# Patient Record
Sex: FEMALE
[De-identification: names, ages, dates, MRNs, and addresses within clinical notes are randomized per-mention and may not be internally consistent; named-entity substitution may affect disease eponyms.]

---

## 2021-05-06 ENCOUNTER — TRANSCRIPTION ENCOUNTER (OUTPATIENT)
Age: 61
End: 2021-05-06

## 2022-09-14 ENCOUNTER — APPOINTMENT (OUTPATIENT)
Dept: ENDOCRINOLOGY | Facility: CLINIC | Age: 62
End: 2022-09-14

## 2022-09-14 VITALS — DIASTOLIC BLOOD PRESSURE: 72 MMHG | SYSTOLIC BLOOD PRESSURE: 118 MMHG

## 2022-09-14 VITALS — BODY MASS INDEX: 23.11 KG/M2 | WEIGHT: 156 LBS | HEIGHT: 69 IN

## 2022-09-14 DIAGNOSIS — Z83.49 FAMILY HISTORY OF OTHER ENDOCRINE, NUTRITIONAL AND METABOLIC DISEASES: ICD-10-CM

## 2022-09-14 DIAGNOSIS — Z78.9 OTHER SPECIFIED HEALTH STATUS: ICD-10-CM

## 2022-09-14 DIAGNOSIS — Z82.3 FAMILY HISTORY OF STROKE: ICD-10-CM

## 2022-09-14 DIAGNOSIS — M81.0 AGE-RELATED OSTEOPOROSIS W/OUT CURRENT PATHOLOGICAL FRACTURE: ICD-10-CM

## 2022-09-14 DIAGNOSIS — Z87.39 PERSONAL HISTORY OF OTHER DISEASES OF THE MUSCULOSKELETAL SYSTEM AND CONNECTIVE TISSUE: ICD-10-CM

## 2022-09-14 DIAGNOSIS — Z83.3 FAMILY HISTORY OF DIABETES MELLITUS: ICD-10-CM

## 2022-09-14 DIAGNOSIS — E55.9 VITAMIN D DEFICIENCY, UNSPECIFIED: ICD-10-CM

## 2022-09-14 DIAGNOSIS — Z86.79 PERSONAL HISTORY OF OTHER DISEASES OF THE CIRCULATORY SYSTEM: ICD-10-CM

## 2022-09-14 DIAGNOSIS — I10 ESSENTIAL (PRIMARY) HYPERTENSION: ICD-10-CM

## 2022-09-14 PROCEDURE — 99204 OFFICE O/P NEW MOD 45 MIN: CPT

## 2022-10-03 NOTE — ASSESSMENT
[FreeTextEntry1] : 62-year-old female with history of osteoporosis and subacute thyroiditis in the past\par Osteoporosis\par Patient is up-to-date with dental exam\par No change in height\par No recent fragility fractures\par Continue Prolia\par Check updated labs prior to blood work\par \par Subacute thyroiditis 2013\par Check updated thyroid function panel as well as thyroid antibodies\par Check ultrasound of thyroid\par \par \par Positive ASO antibody\par Patient asking me for opinion\par I told her it was really not in the realm of endocrinology\par She really needs to discuss this with her primary doctor and perhaps even rheumatologist\par

## 2022-10-03 NOTE — HISTORY OF PRESENT ILLNESS
[FreeTextEntry1] : Patient here for evaluation of osteoporosis\par In addition history of having thyroiditis\par In 2013 patient found to have an elevated CRP that went from 9>>115\par Patient was given steroid, had thyroid sonogram, and never needed medications\par In addition her ASO titers have been high and apparently her endocrinologist could never figure that out  \par \par \par Patient found to have T6 score in the spine of -2.4 T score in the neck -2.1 T score in the hip -2.2\par In 2018 patient took a fall in [place] fractured her pelvis\par \par Current medications include\par Prolia every 6 months\par Methotrexate 6 tablets/week\par Folic acid\par Losartan\par Turmeric\par Probiotic\par D-mannose\par Vitamin D 5000 units daily\par B complex\par Pepcid AC\par Calcium 600 mg 2 pills daily\par Magnesium\par \par Past medical history hypertension\par Osteoporosis\par Osteoarthritis\par Cardiac ablation for extra beats \par Seeing rheumatologist\par Had been on Plaquenil in the past currently on methotrexate\par \par Family history\par Mom osteoarthritis\par Osteopenia\par Elevated A1c\par High cholesterol\par Father carotid disease\par Siblings with diabetes\par \par Allergies\par Sulfa Plaquenil clindamycin Keflex\par

## 2022-10-03 NOTE — REVIEW OF SYSTEMS
[Negative] : Heme/Lymph [de-identified] : Some anxiety with empty nest syndrome–seeing therapist at

## 2022-11-09 ENCOUNTER — APPOINTMENT (OUTPATIENT)
Dept: ENDOCRINOLOGY | Facility: CLINIC | Age: 62
End: 2022-11-09

## 2023-05-03 ENCOUNTER — APPOINTMENT (OUTPATIENT)
Dept: ENDOCRINOLOGY | Facility: CLINIC | Age: 63
End: 2023-05-03

## 2023-08-07 ENCOUNTER — APPOINTMENT (OUTPATIENT)
Dept: PULMONOLOGY | Facility: CLINIC | Age: 63
End: 2023-08-07

## 2024-02-07 ENCOUNTER — APPOINTMENT (OUTPATIENT)
Dept: PULMONOLOGY | Facility: CLINIC | Age: 64
End: 2024-02-07
Payer: COMMERCIAL

## 2024-02-07 VITALS
OXYGEN SATURATION: 97 % | SYSTOLIC BLOOD PRESSURE: 120 MMHG | HEART RATE: 67 BPM | RESPIRATION RATE: 16 BRPM | DIASTOLIC BLOOD PRESSURE: 70 MMHG

## 2024-02-07 VITALS — WEIGHT: 135 LBS | BODY MASS INDEX: 19.94 KG/M2

## 2024-02-07 DIAGNOSIS — J30.89 OTHER ALLERGIC RHINITIS: ICD-10-CM

## 2024-02-07 DIAGNOSIS — Z87.891 PERSONAL HISTORY OF NICOTINE DEPENDENCE: ICD-10-CM

## 2024-02-07 PROCEDURE — 99204 OFFICE O/P NEW MOD 45 MIN: CPT

## 2024-02-07 RX ORDER — LOSARTAN POTASSIUM 100 MG/1
TABLET, FILM COATED ORAL
Refills: 0 | Status: ACTIVE | COMMUNITY

## 2024-02-07 NOTE — ASSESSMENT
[FreeTextEntry1] : 63F PMH former smoker, COVID-19 (2021), HTN, frequent PVCs s/p ablation (2022), pulmonary nodules who presents for initial pulmonary evaluation.  - Pt has been having sudden SOB when submersing herself under water while swimming, as though there is a pressure on her chest - She is otherwise able to tolerate an hour of spin class, above-ground exercises well - Unclear cause of this - however of note it only started approx 3 mo after having COVID in 2021 - Need to r/o cardiac arrhythmia as a cause, given her h/o PVCs s/p ablation - she will f/u with cardiology - She denies history of anxiety, and previously was able to do swimming exercises without issue - She tried albuterol but this did not help - Will do allergy testing - Will have her return for full PFTs to evaluate for underlying asthma - Repeat CT chest 04/2024 - prior RML nodule appears benign, non-PET avid - F/u in short interim for PFT  The patient expressed understanding and agreement with the plan as outlined above and accepts responsibility to be compliant with any recommended testing, treatment, and follow-up visits.  All relevant questions and concerns were addressed.  45 minutes of time were spent on the encounter. Medical records were reviewed, including but not limited to hospital records, outpatient records, laboratory data, and diagnostic imaging studies. Greater than 50% of the face-to-face encounter time was spent on counseling and/or coordination of care.  Hugo Solano M.D. Pulmonary & Critical Care Medicine [facility] at [place] [address] [place], [place] [address] T: [phone] F: [phone]
left normal/right normal

## 2024-02-07 NOTE — HISTORY OF PRESENT ILLNESS
[Former] : former [TextBox_4] : 63F PMH former smoker, COVID-19 (2021), HTN, frequent PVCs s/p ablation (2022), pulmonary nodules who presents for initial pulmonary evaluation. She has a known 11mm RML nodule that was 8mm on prior in 2016. PET-CT 04/2023 was negative for FDG uptake. She notes that approx 3 mo after having COVID in 2021, she started noticing that she feels a pressure in her chest when swimming, specifically when her body is more fully immersed in water. This has never happened before in the past. She denies any anxiety. She denies being dx with asthma. She notably had frequent PVCs in the past that was ablated in 2022.  [TextBox_11] : 0.5 [YearQuit] : 1982 [TextBox_13] : 5

## 2024-02-07 NOTE — RESULTS/DATA
[TextEntry] : [facility]ANGELINEDE[facility] PATIENT NAME: Araceli Pelaez PATIENT PHONE NUMBER: [phone] PATIENT ID: [id number] : 1960 DATE OF EXAM: 2023 R. Phys. Name: Sandra Méndez R. Phys. Address: [address] [place] [address] R. Phys. Phone: [phone] CT-CALCIUM SCORING  History: Screening examination.  Axial images were obtained from the thoracic inlet through the lung bases without intravenous contrast. Sagittal and coronal reconstructions were submitted. This study was performed using automatic exposure control software to provide quality images with patient radiation dose as low as reasonably achievable. The radiation dose for the current examination was estimated at mSv.  The mediastinal, cardiac and pulmonary vascular structures are normal. The patient's total coronary calcium score is 0.  No enlarged lymph nodes are seen within the chest or axillae.  No pleural effusions are present. Trace pericardial fluid.  Calcified and noncalcified pulmonary nodules again seen. No new or enlarging focal pulmonary parenchymal or bronchial abnormalities are seen in either lung.  No focal bony lesions are identified.   IMPRESSION:   1. Total coronary calcium score of 0.  2. No active pulmonary disease.   Classification of Patients based on 10 year Risk of Future Cardiac Event  <10% = Low Risk 10-20% = Intermediate Risk >20% = High Risk  Classification of Patients based on Calcium Scores  Calcium Score Number // 10 Year Risk  0 <2% 1-100 <10% 100-400 >10% >400 >20%  Recommendations based on Calcium Scores  0 Reassure 1-100 Risk Factor Modification 100-400 AHA Prevention Guidelines-consider stress test >400 AHA Prevention Guidelines-evaluate for ischemia  Signed by: Scot Chun Signed Date: 10/2/2023 11:21 PM EDT    SIGNED BY: Scot Chun MD, Ext. [phone] 10/02/2023 11:21 PM   ~~~~~~~~~~~~~~~~~~~~~~~~~~~~~~~~~~~~~~~~~~~~~~~~~~~~~~~~~~~~~~~~~~~~~~~~  [facility] PATIENT NAME: Araceli Pelaez PATIENT PHONE NUMBER: [phone] PATIENT ID: [id number] : 1960 DATE OF EXAM: 2023 R. Phys. Name: Luis Armando Freitas Address: [address] [place] [address] R. Phys. Phone: [phone] CT-CHEST NON CONTRAST  HISTORY: R91.1 Pulmonary Nodule R06.02 Shortness Of Breath  TECHNIQUE: Noncontrast CT of the chest was performed. Axial, coronal and sagittal images were reconstructed using iterative reconstruction technique. This study was performed using automatic exposure control (radiation dose reduction software) to obtain a diagnostic image quality scan with patient dose as low as reasonably achievable. The mA and kV were adjusted according to patient size. The administered radiation dose was 1.918 mSv.  Lung nodules.  Smoking history is provided.  COMPARISON: Chest CT examination 2022 12/15/2022  LUNGS AND AIRWAYS: Biapical scarring is unchanged. There is a stable appearance to an 11 mm right middle lobe nodule (series 2, image 64). There is a stable appearance to sub-5 mm calcified and noncalcified nodules elsewhere within the right lung and within the left lung. There has been no interval development of suspicious nodularity. Fibrotic/subsegmental atelectatic changes in the lungs are again noted. Elevation of the right hemidiaphragm is again seen.  PLEURA: There is no evidence of a pleural effusion.  THYROID GLAND: There is no evidence of thyroid gland enlargement.  MEDIASTINUM, DENICE: There is no evidence of lymphadenopathy.  HEART AND VESSELS: There is no evidence of aneurysmal dilatation of the thoracic aorta. Pericardial thickening and/or small pericardial effusion are noted.  CHEST WALL/SOFT TISSUES/AXILLAE: There is no evidence of axillary lymphadenopathy.  BONES: There is no evidence of an aggressive osseous lesion.   UPPER ABDOMEN (partially imaged): Unremarkable   IMPRESSION:   Stable lung nodularity. There is a stable appearance to an 11 mm right middle lobe nodule and a stable appearance to sub-5 mm calcified and noncalcified probable granulomas. Follow-up chest CT examination in 6 months time is suggested to assess for continued stability.  Pericardial thickening/small pericardial effusion.       Pulmonary nodules (multiple) R91.8 Pericardial effusion I31.3  Signed by: Tristian Garcia MD Signed Date: 3/28/2023 9:33 AM EDT    SIGNED BY: Tristian Garcia M.D., Ext. [id number] 2023 09:33 AM  ADDENDUM: This addendum is being issued following review of a CT angiogram performed at another institution 2016. It is noted that comparison was already made with the prior chest CT examinations performed 2022 and 12/15/2022.  11 mm right middle lobe nodule (series 3, image 238) measured 8 mm on the prior study. A 4 mm noncalcified nodule in the right lower lobe is unchanged (series 3, image 219). Calcified granulomas within the lungs are essentially unchanged.  IMPRESSION:  Calcified and noncalcified nodules are essentially unchanged other than increase in size with respect to the right middle lobe nodule, now measuring 11 mm compared to a prior measurement of 8 mm. It is noted that this 11 mm nodule is stable in appearance when compared to the prior studies performed 2022 and 12/15/2022.  Signed by: Tristian Garcia MD Signed Date: 2023 3:23 PM EDT  ~~~~~~~~~~~~~~~~~~~~~~~~~~~~~~~~~~~~~~~~~~~~~~~~~~~~~~~~~~~~~~~~~~~~~~~~  [facility] 	 Exam requested by: AMISHA CLEVELAND MD [address] [place] [address] SITE PERFORMED: [facility] [place] Patient: ARACELI PELAEZ YOB: 1960 Phone: [phone]  MRN: [id number] Acc: [id number] Date of Exam: 2023   EXAM:  PET CT TUMOR IMAGING SKULL-THIGH  Note - This patient has received 0 CT studies and 0 Myocardial Perfusion studies within our network over the previous 12 month period.  HISTORY: 63-year-old female with history of enlarging right middle lobe lung nodule on CT imaging.  PET/CT REQUESTED FOR: Initial antitumor treatment strategy.  TECHNIQUE: Approximately 75 minutes following intravenous administration of of 10.6 mCi of 18-FDG, via the left antecubital vein, PET/CT imaging was acquired from the cranial vertex to the thighs. At the time of injection, the patient's blood glucose level was 89 mg/dL. Prior to the imaging, a CT examination of the head, chest, abdomen, and pelvis was acquired following administration of barium oral contrast.  Multiplanar reconstructed images were reviewed. One or more of the following dose reduction techniques were used: automated exposure control, adjustment of the MA and/or KV according to patient size, and use of iterative reconstruction technique.  COMPARISON: Report of a prior outside CT scan of the chest dated 3/24/2023.  FINDINGS:  There is no abnormal focal uptake associated with an 11 x 8 mm well-circumscribed nodule in the right middle lobe on functional image 128, which appears to be associated with a central punctate calcification. Additional calcified granulomas measuring up to 5 mm and a 5 mm noncalcified nodule in the right lower lobe on image 129 were described on the report of prior imaging, too small to definitely characterize.  The hilar contours are normal. There are no abnormally enlarged or hypermetabolic lymph nodes in the mediastinum or in the rest of the chest.  Heterogeneous uptake is seen associated with the right shoulder likely degenerative in nature.  Otherwise, uptake in the brain, kidneys, and bladder lumen appears age-appropriate.  ADDITIONAL CT IMAGING FINDINGS:   There is no abnormal mass effect or midline shift in the brain. The ventricular system is not abnormally dilated. The orbits are symmetric without abnormal preseptal soft tissue thickening. The paranasal sinuses and mastoid air cells are well aerated. The central skull base is intact.  There are no suspicious lymph nodes in either side of the neck. There is no abnormal mucosal contour abnormality or focal nodularity in the aerodigestive tract. The major salivary glands are symmetric.  There is no endobronchial soft tissue mass. Mild to moderate respiratory motion accentuates peripheral scarring and posterior dependent changes. There is no pleural effusion. There is no chest wall mass. The heart is normal in size without pericardial effusion.  Respiratory motion in the upper abdomen limits evaluation of the solid organs. The liver and spleen are normal in size without obvious focal lesion. There is no biliary or pancreatic ductal dilatation. There is no adrenal nodule. The kidneys are normal in size without hydronephrosis or hydroureter. There is no bowel obstruction. Oral contrast is seen in the distal colon with visualization of multiple sigmoid diverticula, without acute inflammatory changes. The bladder is collapsed. The uterus is normal in contour. There is no suspicious adnexal mass. There is no omental nodular infiltration. There is no free fluid or free intraperitoneal air.  There is apparent posttraumatic deformity involving the left anterior pubic ramus with a transversely oriented fixation screw at the left sacroiliac joint. Degenerative type changes are seen in the hips, pelvis, shoulders, and multiple levels of the spine, especially in the lower cervical and L5-S1. There are no suspicious osteolytic or osteoblastic type lesions.  IMPRESSION:   1. No focal uptake associated with the reportedly enlarging well-circumscribed nodule in the right middle lobe. An FDG avid malignancy is excluded. The appearance is suggestive of previous granulomatous disease or possibly a pulmonary hamartoma.  2. No suspicious lymph nodes in the chest. No evidence of FDG avid malignancy in the rest of the body.  3. Other incidental findings without abnormal hypermetabolism including architectural changes in the lungs, diverticulosis, posttraumatic deformity in the left anterior pelvis and sacroiliac fixation, and degenerative disease. These could be compared to prior imaging to confirm stability.   Key images provided.  DLP: 1297.03 mGy*cm  ~~~~~~~~~~~~~~~~~~~~~~~~~~~~~~~~~~~~~~~~~~~~~~~~~~~~~~~~~~~~~~~~~~~~~~~~

## 2024-04-29 ENCOUNTER — APPOINTMENT (OUTPATIENT)
Dept: PULMONOLOGY | Facility: CLINIC | Age: 64
End: 2024-04-29
Payer: COMMERCIAL

## 2024-04-29 VITALS — WEIGHT: 144 LBS | BODY MASS INDEX: 21.82 KG/M2 | HEIGHT: 68 IN

## 2024-04-29 DIAGNOSIS — R91.1 SOLITARY PULMONARY NODULE: ICD-10-CM

## 2024-04-29 PROCEDURE — 85018 HEMOGLOBIN: CPT | Mod: QW

## 2024-04-29 PROCEDURE — 94010 BREATHING CAPACITY TEST: CPT

## 2024-04-29 PROCEDURE — 94727 GAS DIL/WSHOT DETER LNG VOL: CPT

## 2024-04-29 PROCEDURE — 94729 DIFFUSING CAPACITY: CPT

## 2024-06-14 ENCOUNTER — APPOINTMENT (OUTPATIENT)
Dept: PULMONOLOGY | Facility: CLINIC | Age: 64
End: 2024-06-14
Payer: COMMERCIAL

## 2024-06-14 ENCOUNTER — RX CHANGE (OUTPATIENT)
Age: 64
End: 2024-06-14

## 2024-06-14 VITALS
SYSTOLIC BLOOD PRESSURE: 120 MMHG | OXYGEN SATURATION: 97 % | DIASTOLIC BLOOD PRESSURE: 82 MMHG | RESPIRATION RATE: 16 BRPM | HEART RATE: 66 BPM

## 2024-06-14 VITALS — WEIGHT: 145 LBS | HEIGHT: 68 IN | BODY MASS INDEX: 21.98 KG/M2

## 2024-06-14 DIAGNOSIS — J44.9 CHRONIC OBSTRUCTIVE PULMONARY DISEASE, UNSPECIFIED: ICD-10-CM

## 2024-06-14 DIAGNOSIS — J45.909 UNSPECIFIED ASTHMA, UNCOMPLICATED: ICD-10-CM

## 2024-06-14 DIAGNOSIS — Z87.891 PERSONAL HISTORY OF NICOTINE DEPENDENCE: ICD-10-CM

## 2024-06-14 PROCEDURE — G2211 COMPLEX E/M VISIT ADD ON: CPT | Mod: NC

## 2024-06-14 PROCEDURE — 99214 OFFICE O/P EST MOD 30 MIN: CPT

## 2024-06-14 RX ORDER — FLUTICASONE PROPIONATE AND SALMETEROL 250; 50 UG/1; UG/1
250-50 POWDER RESPIRATORY (INHALATION)
Qty: 1 | Refills: 5 | Status: ACTIVE | COMMUNITY
Start: 1900-01-01 | End: 1900-01-01

## 2024-06-14 RX ORDER — BUDESONIDE AND FORMOTEROL FUMARATE DIHYDRATE 160; 4.5 UG/1; UG/1
160-4.5 AEROSOL RESPIRATORY (INHALATION)
Qty: 1 | Refills: 5 | Status: DISCONTINUED | COMMUNITY
Start: 2024-06-14 | End: 2024-06-14

## 2024-06-14 RX ORDER — DENOSUMAB 60 MG/ML
60 INJECTION SUBCUTANEOUS
Refills: 0 | Status: ACTIVE | COMMUNITY

## 2024-06-14 NOTE — ASSESSMENT
[FreeTextEntry1] : 64F PMH former smoker, non-PET avid pulmonary nodules, mild COPD, COVID-19 (2021), HTN, frequent PVCs s/p ablation (2022), pulmonary nodules who presents for f/u visit.   - PFT 04/2024 showed FEV1/FVC 64.5%, FEV1 71.1%, FVC 87%, MMEF 75/25 38.4%, TLC 84.2%, VCmax 93.3%, DLCOc 71.7%.  - This is consistent with mild COPD however I do suspect a component of reactive airways disease - Patient is very physically active however mainly notices SOB with swimming - Will do trial of Symbicort PRN - Instructed to rinse mouth after each use - Recent CT was stable - 11mm nodule - PET[facility] at [facility] 04/2023 was non PET-avid - F/u in 4 mo time to re-assess  The patient expressed understanding and agreement with the plan as outlined above and accepts responsibility to be compliant with any recommended testing, treatment, and follow-up visits.  All relevant questions and concerns were addressed.  30 minutes of time were spent on the encounter. Medical records were reviewed, including but not limited to hospital records, outpatient records, laboratory data, and diagnostic imaging studies. Greater than 50% of the face-to-face encounter time was spent on counseling and/or coordination of care.  Hugo Solano MD, [facility] Pulmonary & Critical Care Medicine [facility] Pul[facility] Sleep Medicine at [place] [address] [place], [place] [address] T: [phone] F: [phone]

## 2024-06-14 NOTE — HISTORY OF PRESENT ILLNESS
[Former] : former [TextBox_4] : 64F PMH former smoker, non-PET avid pulmonary nodules, mild COPD, COVID-19 (2021), HTN, frequent PVCs s/p ablation (2022), pulmonary nodules who presents for f/u visit. PFT 04/2024 showed FEV1/FVC 64.5%, FEV1 71.1%, FVC 87%, MMEF 75/25 38.4%, TLC 84.2%, VCmax 93.3%, DLCOc 71.7%. She continues to get SOB primarily with swimming. Notably she is very physically active, has completed multiple marathons in the past. No fevers or chills or chest pains.  [TextBox_11] : 0.5 [TextBox_13] : 5 [YearQuit] : 1982

## 2024-06-14 NOTE — RESULTS/DATA
[TextEntry] : [facility] PATIENT NAME: Araceli Pelaez PATIENT PHONE NUMBER: [phone] PATIENT ID: [id number] : 1960 DATE OF EXAM: 2024 R. Phys. Name: Hugo Solano RLacy Phys. Address: [address] [place] [address] R Phys. Phone: [phone] EXAM: CT-CHEST NON CONTRAST  HISTORY: Lung nodules  TECHNIQUE: Axial images through the chest were obtained without intravenous contrast. Coronal and sagittal reformats were performed. Thin section high-resolution axial images were obtained. Axial MIP images were obtained on a separate workstation and available for interpretation. This study was performed using automatic exposure control and an iterative reconstruction technique (radiation dose reduction software) to obtain a diagnostic image quality scan with patient dose as low as reasonably achievable. The mA and kV were adjusted according to patient size. The administered radiation dose was 2.464mSv.  COMPARISON: CT 3/24/2023, 2022  FINDINGS: Diffuse lung findings and airways: Central airways patent. No focal endobronchial lesion. Scattered calcified granulomas.  INDIVIDUAL LOBES Right upper lobe: No suspicious nodule. Right middle lobe: 11 mm solid nodule (series 3 image 49), similar to prior. Right lower lobe: 4 mm solid nodule (series 3 image 226), similar prior. Left upper lobe: No suspicious nodule. Left lower lobe: No suspicious nodule.  Pleura: Normal. Heart and pericardium: Heart size normal. Small volume pericardial fluid, similar prior. Coronary calcium: No coronary artery calcifications visualized.  Mediastinum and darline: Normal. No lymphadenopathy. Vessels: Normal.  Lower neck: Normal. Chest wall: Normal. Bones: Unremarkable for age. Upper abdomen: Left renal cyst.  IMPRESSION:  * Pulmonary nodules measuring up to 11 mm, similar to CT 2022.  Signed by: Omar Acharya Signed Date: 2024 12:15 PM EDT    SIGNED BY: Omar Acharya MD, Ext. [phone] 12:15 PM  ~~~~~~~~~~~~~~~~~~~~~~~~~~~~~~~~~~~~~~~~~~~~~~~~~~~~~~~~~~~~~~~~~~~~~~~~  [facility]  Exam requested by: AMISHA CLEVELAND MD [address] [place] [address] SITE PERFORMED: [facility] Patient: ARACELI PELAEZ YOB: 1960 Phone: [phone] MRN: [id number] Acc: [id number] Date of Exam: 2023  EXAM: PET CT TUMOR IMAGING SKULL-THIGH  Note - This patient has received 0 CT studies and 0 Myocardial Perfusion studies within our network over the previous 12 month period.  HISTORY: 63-year-old female with history of enlarging right middle lobe lung nodule on CT imaging.  PET/CT REQUESTED FOR: Initial antitumor treatment strategy.  TECHNIQUE: Approximately 75 minutes following intravenous administration of of 10.6 mCi of 18-FDG, via the left antecubital vein, PET/CT imaging was acquired from the cranial vertex to the thighs. At the time of injection, the patient's blood glucose level was 89 mg/dL. Prior to the imaging, a CT examination of the head, chest, abdomen, and pelvis was acquired following administration of barium oral contrast.  Multiplanar reconstructed images were reviewed. One or more of the following dose reduction techniques were used: automated exposure control, adjustment of the MA and/or KV according to patient size, and use of iterative reconstruction technique.  COMPARISON: Report of a prior outside CT scan of the chest dated 3/24/2023.  FINDINGS:  There is no abnormal focal uptake associated with an 11 x 8 mm well-circumscribed nodule in the right middle lobe on functional image 128, which appears to be associated with a central punctate calcification. Additional calcified granulomas measuring up to 5 mm and a 5 mm noncalcified nodule in the right lower lobe on image 129 were described on the report of prior imaging, too small to definitely characterize.  The hilar contours are normal. There are no abnormally enlarged or hypermetabolic lymph nodes in the mediastinum or in the rest of the chest.  Heterogeneous uptake is seen associated with the right shoulder likely degenerative in nature.  Otherwise, uptake in the brain, kidneys, and bladder lumen appears age-appropriate.  ADDITIONAL CT IMAGING FINDINGS:  There is no abnormal mass effect or midline shift in the brain. The ventricular system is not abnormally dilated. The orbits are symmetric without abnormal preseptal soft tissue thickening. The paranasal sinuses and mastoid air cells are well aerated. The central skull base is intact.  There are no suspicious lymph nodes in either side of the neck. There is no abnormal mucosal contour abnormality or focal nodularity in the aerodigestive tract. The major salivary glands are symmetric.  There is no endobronchial soft tissue mass. Mild to moderate respiratory motion accentuates peripheral scarring and posterior dependent changes. There is no pleural effusion. There is no chest wall mass. The heart is normal in size without pericardial effusion.  Respiratory motion in the upper abdomen limits evaluation of the solid organs. The liver and spleen are normal in size without obvious focal lesion. There is no biliary or pancreatic ductal dilatation. There is no adrenal nodule. The kidneys are normal in size without hydronephrosis or hydroureter. There is no bowel obstruction. Oral contrast is seen in the distal colon with visualization of multiple sigmoid diverticula, without acute inflammatory changes. The bladder is collapsed. The uterus is normal in contour. There is no suspicious adnexal mass. There is no omental nodular infiltration. There is no free fluid or free intraperitoneal air.  There is apparent posttraumatic deformity involving the left anterior pubic ramus with a transversely oriented fixation screw at the left sacroiliac joint. Degenerative type changes are seen in the hips, pelvis, shoulders, and multiple levels of the spine, especially in the lower cervical and L5-S1. There are no suspicious osteolytic or osteoblastic type lesions.  IMPRESSION:  1. No focal uptake associated with the reportedly enlarging well-circumscribed nodule in the right middle lobe. An FDG avid malignancy is excluded. The appearance is suggestive of previous granulomatous disease or possibly a pulmonary hamartoma.  2. No suspicious lymph nodes in the chest. No evidence of FDG avid malignancy in the rest of the body.  3. Other incidental findings without abnormal hypermetabolism including architectural changes in the lungs, diverticulosis, posttraumatic deformity in the left anterior pelvis and sacroiliac fixation, and degenerative disease. These could be compared to prior imaging to confirm stability.   Key images provided.  DLP: 1297.03 mGy*cm  ~~~~~~~~~~~~~~~~~~~~~~~~~~~~~~~~~~~~~~~~~~~~~~~~~~~~~~~~~~~~~~~~~~~~~~~~

## 2024-07-11 ENCOUNTER — NON-APPOINTMENT (OUTPATIENT)
Age: 64
End: 2024-07-11

## 2024-10-01 ENCOUNTER — APPOINTMENT (OUTPATIENT)
Dept: PULMONOLOGY | Facility: CLINIC | Age: 64
End: 2024-10-01
Payer: COMMERCIAL

## 2024-10-01 ENCOUNTER — RX CHANGE (OUTPATIENT)
Age: 64
End: 2024-10-01

## 2024-10-01 VITALS
BODY MASS INDEX: 22.13 KG/M2 | WEIGHT: 146 LBS | SYSTOLIC BLOOD PRESSURE: 136 MMHG | HEIGHT: 68 IN | DIASTOLIC BLOOD PRESSURE: 84 MMHG | RESPIRATION RATE: 16 BRPM

## 2024-10-01 VITALS — HEART RATE: 77 BPM | OXYGEN SATURATION: 96 %

## 2024-10-01 PROCEDURE — 99214 OFFICE O/P EST MOD 30 MIN: CPT

## 2024-10-01 PROCEDURE — G2211 COMPLEX E/M VISIT ADD ON: CPT | Mod: NC

## 2024-10-01 RX ORDER — BUDESONIDE AND FORMOTEROL FUMARATE DIHYDRATE 160; 4.5 UG/1; UG/1
160-4.5 AEROSOL RESPIRATORY (INHALATION) 3 TIMES DAILY
Qty: 1 | Refills: 5 | Status: ACTIVE | COMMUNITY
Start: 2024-10-01 | End: 1900-01-01

## 2024-10-01 NOTE — HISTORY OF PRESENT ILLNESS
[Former] : former [TextBox_4] : 64F PMH former smoker, non-PET avid pulmonary nodules, mild COPD, COVID-19 (2021), HTN, frequent PVCs s/p ablation (2022), pulmonary nodules who presents for f/u visit. Currently is on Wixela 250-50. While taking it BID she noticed a persistent cough. Now she is taking it once per day and her cough has subsided. No recent flare ups of COPD. [TextBox_11] : 0.5 [TextBox_13] : 5 [YearQuit] : 1982

## 2024-10-01 NOTE — RESULTS/DATA
[TextEntry] : [facility] PATIENT NAME: Zahida Gore PATIENT PHONE NUMBER: [phone] PATIENT ID: [id number] : 1960 DATE OF EXAM: 2024 R. Phys. Name: Hugo Solano RLacy Phys. Address: [address] [place] [address] R Phys. Phone: [phone] EXAM: CT-CHEST NON CONTRAST  HISTORY: Lung nodules  TECHNIQUE: Axial images through the chest were obtained without intravenous contrast. Coronal and sagittal reformats were performed. Thin section high-resolution axial images were obtained. Axial MIP images were obtained on a separate workstation and available for interpretation. This study was performed using automatic exposure control and an iterative reconstruction technique (radiation dose reduction software) to obtain a diagnostic image quality scan with patient dose as low as reasonably achievable. The mA and kV were adjusted according to patient size. The administered radiation dose was 2.464mSv.  COMPARISON: CT 3/24/2023, 2022  FINDINGS: Diffuse lung findings and airways: Central airways patent. No focal endobronchial lesion. Scattered calcified granulomas.  INDIVIDUAL LOBES Right upper lobe: No suspicious nodule. Right middle lobe: 11 mm solid nodule (series 3 image 49), similar to prior. Right lower lobe: 4 mm solid nodule (series 3 image 226), similar prior. Left upper lobe: No suspicious nodule. Left lower lobe: No suspicious nodule.  Pleura: Normal. Heart and pericardium: Heart size normal. Small volume pericardial fluid, similar prior. Coronary calcium: No coronary artery calcifications visualized.  Mediastinum and darline: Normal. No lymphadenopathy. Vessels: Normal.  Lower neck: Normal. Chest wall: Normal. Bones: Unremarkable for age. Upper abdomen: Left renal cyst.  IMPRESSION:  * Pulmonary nodules measuring up to 11 mm, similar to CT 2022.  Signed by: Omar Acharya Signed Date: 2024 12:15 PM EDT    SIGNED BY: Omar Acharya MD, Ext. [phone] 12:15 PM  ~~~~~~~~~~~~~~~~~~~~~~~~~~~~~~~~~~~~~~~~~~~~~~~~~~~~~~~~~~~~~~~~~~~~~~~~

## 2024-10-01 NOTE — ASSESSMENT
[FreeTextEntry1] : 64F PMH former smoker, non-PET avid pulmonary nodules, mild COPD, COVID-19 (2021), HTN, frequent PVCs s/p ablation (2022), pulmonary nodules who presents for f/u visit.   - Suspect pt had a cough 2/2 powdered inhaler - Will switch Wixela to Symbicort generic 160 PRN - Lungs are clear on exam - Repeat CT 04/2025 ordered - F/u in 3 mo time with spirometry  The patient expressed understanding and agreement with the plan as outlined above and accepts responsibility to be compliant with any recommended testing, treatment, and follow-up visits.  All relevant questions and concerns were addressed.  30 minutes of time were spent on the encounter. Medical records were reviewed, including but not limited to hospital records, outpatient records, laboratory data, and diagnostic imaging studies. Greater than 50% of the face-to-face encounter time was spent on counseling and/or coordination of care.  Hugo Solano MD, [facility] [place] [address] [place], [place] [address] T: [phone] F: [phone]

## 2024-12-24 RX ORDER — BUDESONIDE AND FORMOTEROL FUMARATE DIHYDRATE 160; 4.5 UG/1; UG/1
160-4.5 AEROSOL RESPIRATORY (INHALATION)
Qty: 3 | Refills: 2 | Status: ACTIVE | COMMUNITY
Start: 1900-01-01 | End: 1900-01-01

## 2025-01-31 ENCOUNTER — APPOINTMENT (OUTPATIENT)
Dept: PULMONOLOGY | Facility: CLINIC | Age: 65
End: 2025-01-31

## 2025-01-31 VITALS
OXYGEN SATURATION: 97 % | SYSTOLIC BLOOD PRESSURE: 128 MMHG | DIASTOLIC BLOOD PRESSURE: 88 MMHG | RESPIRATION RATE: 16 BRPM | HEART RATE: 66 BPM

## 2025-01-31 DIAGNOSIS — J44.9 CHRONIC OBSTRUCTIVE PULMONARY DISEASE, UNSPECIFIED: ICD-10-CM

## 2025-01-31 PROCEDURE — 99214 OFFICE O/P EST MOD 30 MIN: CPT | Mod: 25

## 2025-01-31 PROCEDURE — 94010 BREATHING CAPACITY TEST: CPT

## 2025-07-29 ENCOUNTER — APPOINTMENT (OUTPATIENT)
Dept: PULMONOLOGY | Facility: CLINIC | Age: 65
End: 2025-07-29